# Patient Record
Sex: FEMALE | Race: BLACK OR AFRICAN AMERICAN | NOT HISPANIC OR LATINO | Employment: FULL TIME | URBAN - METROPOLITAN AREA
[De-identification: names, ages, dates, MRNs, and addresses within clinical notes are randomized per-mention and may not be internally consistent; named-entity substitution may affect disease eponyms.]

---

## 2019-10-21 ENCOUNTER — LAB VISIT (OUTPATIENT)
Dept: LAB | Facility: HOSPITAL | Age: 29
End: 2019-10-21
Attending: INTERNAL MEDICINE
Payer: COMMERCIAL

## 2019-10-21 ENCOUNTER — OFFICE VISIT (OUTPATIENT)
Dept: ENDOCRINOLOGY | Facility: CLINIC | Age: 29
End: 2019-10-21
Payer: COMMERCIAL

## 2019-10-21 VITALS
DIASTOLIC BLOOD PRESSURE: 72 MMHG | WEIGHT: 219.81 LBS | RESPIRATION RATE: 18 BRPM | TEMPERATURE: 98 F | HEART RATE: 96 BPM | SYSTOLIC BLOOD PRESSURE: 110 MMHG

## 2019-10-21 DIAGNOSIS — E04.2 MULTIPLE THYROID NODULES: ICD-10-CM

## 2019-10-21 DIAGNOSIS — E05.90 HYPERTHYROIDISM: ICD-10-CM

## 2019-10-21 DIAGNOSIS — E04.2 MULTIPLE THYROID NODULES: Primary | ICD-10-CM

## 2019-10-21 LAB
T3FREE SERPL-MCNC: 3.1 PG/ML (ref 2.3–4.2)
T4 FREE SERPL-MCNC: 0.92 NG/DL (ref 0.71–1.51)
THYROGLOB AB SERPL IA-ACNC: 5.5 IU/ML (ref 0–3.9)
THYROPEROXIDASE IGG SERPL-ACNC: 234.1 IU/ML
TSH SERPL DL<=0.005 MIU/L-ACNC: 3.66 UIU/ML (ref 0.4–4)

## 2019-10-21 PROCEDURE — 86800 THYROGLOBULIN ANTIBODY: CPT

## 2019-10-21 PROCEDURE — 84445 ASSAY OF TSI GLOBULIN: CPT

## 2019-10-21 PROCEDURE — 99244 OFF/OP CNSLTJ NEW/EST MOD 40: CPT | Mod: S$GLB,,, | Performed by: INTERNAL MEDICINE

## 2019-10-21 PROCEDURE — 36415 COLL VENOUS BLD VENIPUNCTURE: CPT

## 2019-10-21 PROCEDURE — 84481 FREE ASSAY (FT-3): CPT

## 2019-10-21 PROCEDURE — 86376 MICROSOMAL ANTIBODY EACH: CPT

## 2019-10-21 PROCEDURE — 84439 ASSAY OF FREE THYROXINE: CPT

## 2019-10-21 PROCEDURE — 84443 ASSAY THYROID STIM HORMONE: CPT

## 2019-10-21 PROCEDURE — 99244 PR OFFICE CONSULTATION,LEVEL IV: ICD-10-PCS | Mod: S$GLB,,, | Performed by: INTERNAL MEDICINE

## 2019-10-21 PROCEDURE — 99999 PR PBB SHADOW E&M-NEW PATIENT-LVL III: CPT | Mod: PBBFAC,,, | Performed by: INTERNAL MEDICINE

## 2019-10-21 PROCEDURE — 99999 PR PBB SHADOW E&M-NEW PATIENT-LVL III: ICD-10-PCS | Mod: PBBFAC,,, | Performed by: INTERNAL MEDICINE

## 2019-10-21 RX ORDER — NORETHINDRONE ACETATE AND ETHINYL ESTRADIOL AND FERROUS FUMARATE 1MG-20(21)
KIT ORAL
Refills: 11 | COMMUNITY
Start: 2019-09-06

## 2019-10-21 NOTE — PROGRESS NOTES
Subjective:       Patient ID: Monica Grewal is a 29 y.o. female.  Patient is new to me  Chief Complaint: Establish Care (Thyroid)    HPI    Consultation was requested by Dr. Jeyson Shelby       Diagnosed:  Known hx of thyroid nodule- hx of multiple u/s- last u/s was 8 to 9 years ago and she reports at that time she was told had a nodule only on 1 side and was not changing and did not require a biopsy so she chose not to continue following up with it.  She was seeing another PCP at the time    She was recently found to have abnormal thyroid function tests showing a suppressed TSH level of 0.06 August 26, 2019.  Her free T4 and free T3 were normal  Previous radiology tests:  Thyroid ultrasound : Yes - outside ultrasound mentions multiple bilateral nodules with largest on right measuring 2.6 cm and on left measuring 2.1 cm   NM uptake and scan: No    Previous thyroid surgery: No      Thyroid symptoms:fatigue, weight gain and sweating    ON BCP,prior to this would skip months- would have cycle 2 to 3 times a year    Told has PCOS  Has hoarseness at times    Mom has hypothyroidism, mat aunt and sister had   Thyroid medications: none      Takes medication appropriately: n/a    I have reviewed the past medical, family and social history  Review of Systems   Constitutional: Positive for fatigue and unexpected weight change. Negative for appetite change and fever.   HENT: Negative for sore throat and trouble swallowing.    Eyes: Negative for visual disturbance.   Respiratory: Negative for shortness of breath and wheezing.    Cardiovascular: Negative for chest pain, palpitations and leg swelling.   Gastrointestinal: Negative for diarrhea, nausea and vomiting.   Endocrine: Negative for cold intolerance, heat intolerance, polydipsia, polyphagia and polyuria.   Genitourinary: Negative for difficulty urinating, dysuria and menstrual problem.   Musculoskeletal: Negative for arthralgias and joint swelling.   Skin: Negative for  rash.   Neurological: Negative for dizziness, weakness, numbness and headaches.   Psychiatric/Behavioral: Negative for confusion, dysphoric mood and sleep disturbance.       Objective:      Physical Exam   Constitutional: She is oriented to person, place, and time. No distress.   HENT:   Head: Normocephalic and atraumatic.   Right Ear: External ear normal.   Left Ear: External ear normal.   Nose: Nose normal.   Mouth/Throat: Oropharynx is clear and moist. No oropharyngeal exudate.   Eyes: Pupils are equal, round, and reactive to light. Conjunctivae and EOM are normal. No scleral icterus.   Neck: No JVD present. No tracheal deviation present. Thyromegaly present.   Cardiovascular: Normal rate, regular rhythm, normal heart sounds and intact distal pulses. Exam reveals no gallop and no friction rub.   No murmur heard.  Pulmonary/Chest: Effort normal and breath sounds normal. No respiratory distress. She has no wheezes. She has no rales.   Abdominal: Soft. Bowel sounds are normal. She exhibits no distension and no mass. There is no tenderness. There is no rebound and no guarding. No hernia.   Musculoskeletal: She exhibits no edema or deformity.   Lymphadenopathy:     She has no cervical adenopathy.   Neurological: She is alert and oriented to person, place, and time. She has normal reflexes. No cranial nerve deficit.   Skin: Skin is warm. No rash noted. She is not diaphoretic. No erythema. No pallor.   Psychiatric: She has a normal mood and affect. Her behavior is normal.   Vitals reviewed.        Lab Review:   See above for lab and radiology review    Assessment:     1. Multiple thyroid nodules  TSH    T4, free    T3, free    Thyroid stimulating immunoglobulin    Anti-thyroglobulin antibody    Thyroid peroxidase antibody    NM Thyroid Uptake and Scan    US Soft Tissue Head Neck Thyroid   2. Hyperthyroidism  TSH    T4, free    T3, free    Thyroid stimulating immunoglobulin    Anti-thyroglobulin antibody    Thyroid  peroxidase antibody    NM Thyroid Uptake and Scan    Differential for hyperthyroidism includes Grave's disease, thyroiditis, and toxic nodule  I would like to get a more characteristics of her thyroid nodules to decide if an FNA is warranted given she has suppressed TSH.  Will need more information regarding calcification and would like to image the nodules to see if there any border irregularities so will obtain another ultrasound.  Recheck thyroid labs to see if she is still hyperthyroid and if has worsened.  Check thyroid antibodies and thyroid uptake and scan to help differentiate cause of hyperthyroidism    Discussed possible cause could be toxic nodule or nodules and explained treatment often is with radioactive iodine and went over radioactive iodine precautions as well as a handout on radioactive iodine precautions given.  Surgery can be done also especially from women who are desiring to give birth soon which is not the case.  Discussed anti thyroid medication though this is not curative in the case of toxic nodule but can be given to correct hyperthyroidism due to toxic nodule or Graves disease      Plan:   Multiple thyroid nodules  -     TSH; Future; Expected date: 10/21/2019  -     T4, free; Future; Expected date: 10/21/2019  -     T3, free; Future; Expected date: 10/21/2019  -     Thyroid stimulating immunoglobulin; Future; Expected date: 10/21/2019  -     Anti-thyroglobulin antibody; Future; Expected date: 10/21/2019  -     Thyroid peroxidase antibody; Future; Expected date: 10/21/2019  -     NM Thyroid Uptake and Scan; Future; Expected date: 10/21/2019  -     US Soft Tissue Head Neck Thyroid; Future; Expected date: 10/21/2019    Hyperthyroidism  -     TSH; Future; Expected date: 10/21/2019  -     T4, free; Future; Expected date: 10/21/2019  -     T3, free; Future; Expected date: 10/21/2019  -     Thyroid stimulating immunoglobulin; Future; Expected date: 10/21/2019  -     Anti-thyroglobulin antibody;  Future; Expected date: 10/21/2019  -     Thyroid peroxidase antibody; Future; Expected date: 10/21/2019  -     NM Thyroid Uptake and Scan; Future; Expected date: 10/21/2019        No follow-ups on file.     Thank you to Dr. Jeyson Shelby for this consultation      Disclaimer:  This note may have been partially prepared using voice recognition software and  it may have not been extensively proofed, as such there could be errors within the text such as sound alike errors.

## 2019-10-21 NOTE — LETTER
October 21, 2019      Jeyson Shelby MD  [address]  [address]  [place] [address]           [facility]  [address] [place] [address]  Phone: [phone]  Fax: [phone]          Patient: Monica Grewal   MR Number: [id number]   YOB: 1990   Date of Visit: 10/21/2019       Dear Dr. Jeyson Shelby:    Thank you for referring Monica Grewal to me for evaluation. Attached you will find relevant portions of my assessment and plan of care.    If you have questions, please do not hesitate to call me. I look forward to following Monica Grewal along with you.    Sincerely,    Jessica Walker MD    Enclosure  CC:  No Recipients    If you would like to receive this communication electronically, please contact [email or web address] or [phone] to request more information on [employer] access.    For providers and/or their staff who would like to refer a patient to [facility], please contact us through our one-stop-shop provider referral line, [facility], at [phone].    If you feel you have received this communication in error or would no longer like to receive these types of communications, please e-mail [email or web address]

## 2019-10-23 LAB — TSI SER-ACNC: <0.1 IU/L

## 2019-10-24 ENCOUNTER — PATIENT MESSAGE (OUTPATIENT)
Dept: ENDOCRINOLOGY | Facility: CLINIC | Age: 29
End: 2019-10-24

## 2019-10-28 ENCOUNTER — TELEPHONE (OUTPATIENT)
Dept: RADIOLOGY | Facility: HOSPITAL | Age: 29
End: 2019-10-28

## 2019-10-29 ENCOUNTER — HOSPITAL ENCOUNTER (OUTPATIENT)
Dept: RADIOLOGY | Facility: HOSPITAL | Age: 29
Discharge: HOME | End: 2019-10-29
Attending: INTERNAL MEDICINE
Payer: COMMERCIAL

## 2019-10-29 DIAGNOSIS — E04.2 MULTIPLE THYROID NODULES: ICD-10-CM

## 2019-10-29 DIAGNOSIS — E05.90 HYPERTHYROIDISM: ICD-10-CM

## 2019-10-29 PROCEDURE — A9516 IODINE I-123 SOD IODIDE MIC: HCPCS

## 2019-10-29 PROCEDURE — 76536 US EXAM OF HEAD AND NECK: CPT | Mod: 26,,, | Performed by: RADIOLOGY

## 2019-10-29 PROCEDURE — 76536 US SOFT TISSUE HEAD NECK THYROID: ICD-10-PCS | Mod: 26,,, | Performed by: RADIOLOGY

## 2019-10-29 PROCEDURE — 78014 THYROID IMAGING W/BLOOD FLOW: CPT | Mod: 26,,, | Performed by: RADIOLOGY

## 2019-10-29 PROCEDURE — 76536 US EXAM OF HEAD AND NECK: CPT | Mod: TC

## 2019-10-29 PROCEDURE — 78014 NM THYROID UPTAKE AND SCAN: ICD-10-PCS | Mod: 26,,, | Performed by: RADIOLOGY

## 2019-10-30 ENCOUNTER — HOSPITAL ENCOUNTER (OUTPATIENT)
Dept: RADIOLOGY | Facility: HOSPITAL | Age: 29
Discharge: HOME | End: 2019-10-30
Attending: INTERNAL MEDICINE
Payer: COMMERCIAL

## 2019-11-05 ENCOUNTER — PATIENT MESSAGE (OUTPATIENT)
Dept: ENDOCRINOLOGY | Facility: CLINIC | Age: 29
End: 2019-11-05

## 2019-11-05 ENCOUNTER — TELEPHONE (OUTPATIENT)
Dept: ENDOCRINOLOGY | Facility: CLINIC | Age: 29
End: 2019-11-05

## 2019-11-05 NOTE — TELEPHONE ENCOUNTER
Called patient with results with return verbalization of results from patient. Will send appointment template to her in portal so she can schedule her appointment       ----- Message from Jessica Walker MD sent at 11/5/2019  2:59 PM CST -----  Contact: self [phone]  Can let patient know her thyroid ultrasound showed only 1 small nodule, no need for biopsy but will follow over time.  Based on lab tests and ultrasound findings she has findings consistent with autoimmune thyroid or Hashimoto's.  Her labs have already started to change.  No need for medication right now, just need to monitor over time.  Make her a 3 month follow-up appointment  ----- Message -----  From: Moon Rios LPN  Sent: 11/5/2019   1:40 PM CST  To: Jessica Walker MD    Please advise    ----- Message -----  From: Roxane Wheeler  Sent: 11/5/2019   1:31 PM CST  To: [facility] Staff    .Type:  Test Results    Who Called: Monica Grewal  Name of Test (Lab/Mammo/Etc): Thyroid Scan  Date of Test: 10/29/19   Ordering Provider:   Where the test was performed: [facility]  Would the patient rather a call back or a response via [facility]? Call back  Best Call Back Number: [phone]  Additional Information:

## 2019-11-21 ENCOUNTER — PATIENT MESSAGE (OUTPATIENT)
Dept: ENDOCRINOLOGY | Facility: CLINIC | Age: 29
End: 2019-11-21